# Patient Record
(demographics unavailable — no encounter records)

---

## 2024-11-14 NOTE — HEALTH RISK ASSESSMENT
[No] : In the past 12 months have you used drugs other than those required for medical reasons? No [Never] : Never [] :  [With Significant Other] : lives with significant other [Sexually Active] : sexually active [Little interest or pleasure doing things] : 1) Little interest or pleasure doing things [Feeling down, depressed, or hopeless] : 2) Feeling down, depressed, or hopeless [0] : 2) Feeling down, depressed, or hopeless: Not at all (0) [PHQ-2 Negative - No further assessment needed] : PHQ-2 Negative - No further assessment needed [NO] : No [Employed] : employed [de-identified] : tries to go to the gym -- does cardio and weights.   [de-identified] : feels it is okay [[id number]] : 0 [High Risk Behavior] : no high risk behavior [FreeTextEntry2] : RN

## 2024-11-14 NOTE — COUNSELING
[Potential consequences of obesity discussed] : Potential consequences of obesity discussed [Benefits of weight loss discussed] : Benefits of weight loss discussed [Encouraged to increase physical activity] : Encouraged to increase physical activity [Decrease Portions] : decrease portions [____ min/wk Activity] : [unfilled] min/wk activity [Good understanding] : Patient has a good understanding of disease, goals and obesity follow-up plan [FreeTextEntry4] : 15

## 2024-11-14 NOTE — PHYSICAL EXAM
[No Acute Distress] : no acute distress [Well-Appearing] : well-appearing [Normal Sclera/Conjunctiva] : normal sclera/conjunctiva [EOMI] : extraocular movements intact [Normal Outer Ear/Nose] : the outer ears and nose were normal in appearance [Normal Nasal Mucosa] : the nasal mucosa was normal [No Respiratory Distress] : no respiratory distress  [No Accessory Muscle Use] : no accessory muscle use [Clear to Auscultation] : lungs were clear to auscultation bilaterally [Normal Rate] : normal rate  [Regular Rhythm] : with a regular rhythm [Normal S1, S2] : normal S1 and S2 [No Edema] : there was no peripheral edema [No CVA Tenderness] : no CVA  tenderness [No Spinal Tenderness] : no spinal tenderness [No Joint Swelling] : no joint swelling [Grossly Normal Strength/Tone] : grossly normal strength/tone [Coordination Grossly Intact] : coordination grossly intact [Normal Gait] : normal gait [Normal Affect] : the affect was normal [Normal Insight/Judgement] : insight and judgment were intact [PERRL] : pupils equal round and reactive to light [Normal Oropharynx] : the oropharynx was normal [Normal TMs] : both tympanic membranes were normal [No Lymphadenopathy] : no lymphadenopathy [Supple] : supple [Soft] : abdomen soft [Non Tender] : non-tender [Non-distended] : non-distended [Normal Bowel Sounds] : normal bowel sounds [Normal Posterior Cervical Nodes] : no posterior cervical lymphadenopathy [Normal Anterior Cervical Nodes] : no anterior cervical lymphadenopathy

## 2024-11-14 NOTE — HISTORY OF PRESENT ILLNESS
[FreeTextEntry1] : CPE [de-identified] : Patient presents today for a CPE.  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  Pt also has a PMHx of epilepsy /2 to cavernoma -- has not had a seizure since 2021 and has been off AEDs for about 2 years. Has followed with neurology regularly, but no longer requires follow ups.

## 2024-11-27 NOTE — HISTORY OF PRESENT ILLNESS
[FreeTextEntry1] : Ms. Mason is a 36-year-old woman with class 1 obesity, polycystic ovary syndrome (PCOS), irritable bowel syndrome (IBS), and a past brain cavernoma with associated seizure disorder (seizure-free and off anti-seizure medications for at least two years following surgical intervention) who presents to the clinic to establish care for weight management. She works as a nurse for [employer].   OBESITY Ms. Mason has been on a weight loss journey for many years, as she has struggled with being overweight since childhood. She once followed a strict diet and exercise regimen that helped her achieve a "normal weight". However, after discontinuing this lifestyle approximately 14-15 years ago, she began to regain weight. 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 She believes that this difficulty, along with the introduction of anti-seizure medications, may have contributed to her weight gain.  In 2021, following brain surgery, her weight peaked at 270 lbs. Through determined dietary and lifestyle changes, including exercising two to three hours daily, she managed to lose approximately 50 lbs. Previously, she consumed a diet high in sweets and carbohydrates, which she has since modified. Her weight plateaued at 220 lbs. Subsequently, she began taking Tirzepatide three months ago, initially at 2.5 mg for the first two months and currently at 5 mg for the past month, obtained through a compound pharmacy. She initially opted not to pursue insurance coverage but now seeks it due to FDA regulations prohibiting compounded formulations of this medication. She denies experiencing side effects from the medication and notes that her IBS symptoms have improved. Since starting Tirzepatide, she has lost an additional 20 lbs and aims to reach a weight of 150-160 lbs. She is afraid of weight regain if she cannot continue the medication moving forward.   Regarding her diet, she consumes 6-7 cups of coffee with low-fat milk (1%) daily, skips breakfast, and typically eats chicken with or without vegetables for lunch. Her dinner includes yogurt and fruit, such as pears or apples, and she denies consuming snacks, soda, or juice.  Her exercise regimen includes cardio, weight training, and dancing classes, with gym visits four times per week, and she completes 30 minutes on a stationary bike at home three times per week on workdays.  PCOS Ms. Mason has been informed that she may have polycystic ovary syndrome (PCOS) due to her irregular menstrual cycles and hirsutism. Additionally, previous laboratory tests have indicated hyperandrogenism, as shown by elevated testosterone levels in the 50s ng/dL.  She began menstruating at age 17 and experienced heavy bleeding, which was initially managed with birth control pills prescribed in [place]. She used these for nearly 10 years. She later transitioned to an IUD (Mirena) and is currently on her second one. During its use, she did not experience menstrual cycles. However, since starting Tirzepatide, she has noticed light spotting over the past three months, though not heavy bleeding. There have been no changes in her peripheral vision or breast secretions.  She reports excessive facial hair growth, requiring shaving every other day, and also shaves her arms. She has light-colored stretch marks, which were once slightly purple, that appeared during significant weight gain many years ago. Additionally, she reports experiencing easy bruising and bleeding.

## 2024-11-27 NOTE — ADDENDUM
[FreeTextEntry1] : Time-Based Billing: I have spent 55 minutes on the encounter. This includes time spent with the patient during the visit as well as time spent before and after the visit reviewing the chart, documenting the encounter, making phone calls, reviewing studies, etc.

## 2024-11-27 NOTE — REVIEW OF SYSTEMS
[Recent Weight Loss (___ Lbs)] : recent weight loss: [unfilled] lbs [Irregular Menses] : irregular menses [Hirsutism] : hirsutism [Easy Bleeding] : a ~M tendency for easy bleeding [Easy Bruising] : a tendency for easy bruising [Fatigue] : no fatigue [Fever] : no fever [Visual Field Defect] : no visual field defect [Chest Pain] : no chest pain [Shortness Of Breath] : no shortness of breath [Nausea] : no nausea [Constipation] : no constipation [Vomiting] : no vomiting [Diarrhea] : no diarrhea [Galactorrhea] : no galactorrhea

## 2024-11-27 NOTE — PHYSICAL EXAM
[Alert] : alert [Well Nourished] : well nourished [Obese] : obese [No Acute Distress] : no acute distress [Normal Sclera/Conjunctiva] : normal sclera/conjunctiva [EOMI] : extra ocular movement intact [No Proptosis] : no proptosis [Thyroid Not Enlarged] : the thyroid was not enlarged [No Thyroid Nodules] : no palpable thyroid nodules [No Respiratory Distress] : no respiratory distress [No Accessory Muscle Use] : no accessory muscle use [Clear to Auscultation] : lungs were clear to auscultation bilaterally [Normal S1, S2] : normal S1 and S2 [Normal Rate] : heart rate was normal [Regular Rhythm] : with a regular rhythm [No Edema] : no peripheral edema [Not Tender] : non-tender [Soft] : abdomen soft [Normal Gait] : normal gait [Normal Reflexes] : deep tendon reflexes were 2+ and symmetric [No Tremors] : no tremors [Oriented x3] : oriented to person, place, and time

## 2024-11-27 NOTE — ASSESSMENT
[Weight Loss] : weight loss [FreeTextEntry1] : 1. Class 1 obesity (BMI 31) - She successfully lost 50 lbs on her own but reports that her weight loss plateaued, and therefore she started Tirzepatide from a compound pharmacy. She has been using it for the past 3 months with an additional 20-pound weight loss reported over the past 3 months.  Goal weight is approximately 150-160 pounds.  Current weight is 199 pounds.  Desires to lose an additional 50 pounds. - Continue Zepbound 5 mg weekly.  Discussed the need for a bariatric surgeon consultation and possible enrollment in the [facility] program per insurance requirements. Patient to contact insurance regarding prior authorization requirements.  - Continue dietary and lifestyle changes.  - Referred to nutritionist for further diet counseling.   2. PCOS: - Irregular menses and hirsutism raise suspicion for PCOS. - Discussed options for hirsutism management, including oral contraceptive pills, spironolactone (second-line if OCPs are ineffective), and laser hair removal. Patient plans to continue with IUD for another year, focus on weight loss, and proceed with laser hair removal.  - Check 1-mg dexamethasone suppression test to rule out hypercortisolism as a contributing factor to weight gain. 17-OHP previously within normal limits. Recent thyroid function tests were normal earlier this month.   Follow-up in 3 months.

## 2024-12-27 NOTE — ASSESSMENT
[FreeTextEntry1] : Nausea: The patient complains of nausea. If the symptoms of nausea persists, the patient may require a trial of Zofran 4 mg twice a day.  If the symptoms persist, the patient may require an upper endoscopy to assess for peptic ulcer disease versus esophagitis.  The patient was told of the risks and benefits of the procedure.  The patient was told of the risks of perforation, emergency surgery, bleeding, infections and missed lesions.  The patient agreed and will follow-up to reassess the symptoms. Rectal Bleeding: The patient had episodes of rectal bleeding.  The etiology of the rectal bleeding is unclear.  I recommend a trial of Anusol HC suppositories one per rectum QHS and Anusol HC 2.5% cream apply to affected area twice a day PRN hemorrhoidal bleeding or pain.  I recommend a trial of Calmoseptine cream apply to affected area twice a day for rectal discomfort. I recommend Tucks pads for the hemorrhoids.  I recommend starting Sitz baths twice a day for the hemorrhoids.  I recommend avoid wearing tight underwear and use boxers. I recommend avoid touching the perineal area. I recommend a colonoscopy to assess the site of bleeding. The patient was told of the risks and benefits of the procedure.  The patient was told of the risks of perforation, emergency surgery, bleeding, infections and missed lesions.  The patient agreed and will schedule for the procedure. The patient is to be n.p.o. after midnight and bowel prep was given.  The patient is to return for the procedure. Family History of Colon Cancer: The patient has a family history of colon cancer.  The patient's maternal grandfather had a history of colon cancer.   Colonoscopy: I recommend a colonoscopy to assess the symptoms and for colonic polyps.  The patient was told of the risks and benefits of the procedure.  The patient was told of the risks of perforation, emergency surgery, bleeding, infections and missed lesions.  The patient is to be on a clear liquid diet the entire day prior to the procedure. The patient is to complete the entire prescribed bowel prep the day prior to the procedure as directed. The patient is told not to drive, drink alcohol, use recreational drugs, exercise, or work the day of the procedure.  The patient was told of the need for an escort to accompany the patient home after the procedure. The patient is aware that the procedure may be cancelled if they fail to follow the directions.  The patient agreed and will schedule for the procedure. The patient is to be n.p.o. after midnight and bowel prep was given.  The patient is to return for the procedure. Follow-up: The patient is to follow-up in the office in 4 weeks to reassess the symptoms. The patient was told to call the office if any further problems.

## 2024-12-27 NOTE — HISTORY OF PRESENT ILLNESS
[FreeTextEntry1] : The patient is a 36-year-old female with past medical history significant for epilepsy, s/p brain surgery for Cavernoma in , bradycardia, irritable bowel syndrome diagnosed 1 year ago on PRN Bentyl, on Zepbound for weight loss and polycystic ovarian syndrome who was referred to my office by Dr. Kole Dunlap for rectal bleeding.  The patient also complained of prior diarrhea, change in bowel habits and change in caliber of stool. The patient also admits to having nausea. The patient has a family history of colon cancer.  The patient's maternal grandfather had a history of colon cancer.  I was asked to render an opinion for consultation for the above complaints.   The patient states that she is feeling fine.  The patient denies any abdominal pain.  The patient denies any abdominal gas and bloating.  The patient complains of nausea secondary to Zepbound but denies any vomiting.  The patient complains of gastroesophageal reflux disease but denies any dysphagia. The gastroesophageal reflux disease is worse after meals and in the early morning.  The gastroesophageal reflux disease is improved with proton pump inhibitors, H2 blockers and antacids.  The patient denies any atypical chest pain, shortness of breath or palpitations.  The patient denies any diaphoresis. The patient admits to occasional episodes of diaphoresis.  The chest pain is described as being 5 out of 10 in intensity.  The chest pain can occur at any time.  The chest pain is worse at night and early morning.  The chest pain is worse after meals and improves with passing gas.  The chest pain is unrelated to meals and passing gas.  The chest pain never awakened the patient from sleep.  The patient complains of nausea and vomiting but denies any nausea or vomiting.  The patient previously complained of diarrhea for a few weeks approximately 1 year ago that improved with Bentyl.  The patient currently denies any constipation or diarrhea.  The patient has 1 bowel movement a day. The diarrhea was previously described as watery in nature.   The patient complained of a change in bowel habits.  The patient complained of a change in caliber of stool.   The patient admits to having mucus discharge with the bowel movements.  The patient complains of rectal bleeding x 2 months but denies any melena or hematemesis.  The rectal bleeding is associated with internal hemorrhoids.  The patient complains of rectal pruritus but denies any rectal pain. The patient complains of weight loss but denies any anorexia.  The patient admits to losing 33 pounds over the past 3 months. The patient attributes the weight loss to recent change in diet, Zepbound and exercise. She denies any fevers or chills.  The patient denies any jaundice or pruritus.  The patient denies any back pain.  The blood work performed on 2024 revealed a normal WBC count of 7.61 K/UL, no evidence of anemia with a hemoglobin/hematocrit level of 14.6/45.0, respectively, a normal platelet count of 282,000, and elevated total cholesterol level of 201 mg/dL, a normal triglyceride level of 54 mg/dL, normal liver enzymes with a total bilirubin of 0.6 mg/dL, a normal alkaline phosphatase/AST/ALT of 78/15/11 E/L, respectively, a normal hemoglobin A1c of 4.6% with an estimated average glucose of 85 mg/dL.  The blood work performed on 2024 revealed a normal free T4 level of 1.1 ng/dL, and elevated prolactin level of 31.0 ng/mL, a normal FSH of 4.1 IU/L, a normal TSH of 1.17u IU/mL, a normal LH of 5.9 IU/L, a normal testosterone level of 22.3 ng/dL, a normal cortisol level of 0.6 mcg/dL, a normal 17 hydroxyprogesterone level of 121 ng/dL. The patient denies ever having a prior upper endoscopy and colonoscopy performed by another gastroenterologist.  The patient's last menstrual period was approximately 6 months ago.  The patient admits to having an IUD placed.  The patient's periods are irregular at 28 to 30 days.  The patient's menstrual periods are heavy for 7 days.  The patient is a .  The patient's first menstrual period was at age 17. The patient admits to a family history of GI problems.  The patient's maternal grandfather had a history of colon cancer and maternal aunt had colonic polyps.  (-) smoking, (-) ETOH, (-) IVDA  Physical Exam: General Appearance: Overweight, no acute distress ENT:  nose clear, ears unremarkable Eyes: No enteric sclera, conjunctiva clear. Neck: Supple, without masses  Respiratory: Breath sounds equal and bilateral, no wheezing no rales or rhonchi Cardiovascular: S1-S2 audible, no murmur, no rubs or gallops GI: (+) BS, soft, nontender, no rebound, no guarding, no masses Liver: liver edge palpated Rectal: nontender, no masses, good sphincter tone, brown stool. Stool for Guaiac sent to lab for occult blood Lymphatics: No palpable lymph nodes, no masses Musculo-skeletal: Good motor strength, good range of motion, normal appearing extremities Skin: Normal appearing skin, no jaundice, no rashes or nodules Neurological: without focal motor or sensory deficits Patient is moving all extremities spontaneously and to command with normal muscle strength alert and oriented X3 Psychiatric: Good affect, not depressed, not anxious

## 2025-01-02 NOTE — HISTORY OF PRESENT ILLNESS
[FreeTextEntry1] :  ECHO BERMAN ,35 YO, Presents for Annual  No complaints PMHX: Epilepsey, Irritable bowel syndrome, gastroesophageal reflux disease (gerd) PSHX: Brain surgery GYNHX :(Denies Not a smoker LMP: Not sure. Patient has an IUD Medication Zepbound 5 MG, pantoprazole 40 mg Not sexually active Last pap was in 2021 Never had a Mammo No family History of breast cancer. (Colon cancer in the family)

## 2025-01-08 NOTE — ASSESSMENT
[FreeTextEntry1] : --  #hand dermatitis -gentle care, beta dip prn flares. Proper use and SE meds disc  #recurrent folliculitis R inner thigh - 2 solitary lesions - healing. none active today -discussed with pt ddx recurrent follic. pt worried about HS. could represent early stage I - possible. but definitely not dfinitive at this pt. would need to follow over time -rec start for tx and prevention daily with bathing -hibi wash -clinda topical

## 2025-01-08 NOTE — PHYSICAL EXAM
[FreeTextEntry3] :  Gen:                        Well appearing, well nourished, NAD. Skin:                       Eccrine:                 Within normal limits           Face:                      UE:                         LE:                          Groin:                             Neuro:                     No focal deficits. Age appropriate. Psych:                     Appropriate affect.

## 2025-01-08 NOTE — HISTORY OF PRESENT ILLNESS
[FreeTextEntry1] : 2 nodules R inner thigh [de-identified] : 2 nodules R inner thigh healing for past 3 months. pt worried about HS. no tenderness, active lesions or draining today also hand dermatitis a/w req hand washing

## 2025-01-29 NOTE — ASSESSMENT
[FreeTextEntry1] : Abdominal Pain: The patient complains of abdominal pain. The patient is to avoid nonsteroidal anti-inflammatory drugs and aspirin.  I recommend a low FOD-MAP diet.  I recommend a trial of Dicyclomine 10 mg tablet PO 3 times a day PRN for the abdominal pain. Dyspepsia: The patient complains of dyspeptic symptoms.  The patient was advised to continue to abide by an anti-gas (low FOD-MAP) diet.  The patient was previously given a pamphlet for anti-gas (low FOD-MAP).  The patient and I reviewed the anti-gas (low FOD-MAP) diet at length again. The patient is to continue on a trial of Simethicone one tablet 4 times a day p.r.n. abdominal pain and gas. GERD: The patient was advised to avoid late-night meals and dietary indiscretions.  The patient was advised to avoid fried and fatty foods.  The patient was advised to abide by an anti-GERD diet. The patient was given a pamphlet for anti-GERD.  The patient and I reviewed the anti-GERD diet at length. I recommend a trial of Pantoprazole 40 mg once a day x 3 months for the symptoms. Diarrhea: The patient complains of diarrhea.  I recommend a low residue diet. The patient is to avoid fiber supplementation. The patient is to consider starting a trial of a probiotic such as Align once a day.  If the symptoms persist, the patient may require sending stool studies for C+S, O+P x3, and C. difficile to assess for an infectious etiology of the diarrhea.   The patient agreed and will follow-up to reassess the symptoms.   Colonic Polyp: The patient was found to have colonic polyps on prior colonoscopy.  I recommend a repeat colonoscopy in 5 years to reassess for colonic polyps pending patient's health unless symptomatic.  The patient agreed and will follow up for the procedure.  Internal Hemorrhoids: The patient is to consider starting a trial of Anusol H. C. suppositories one per rectum nightly and Anusol HC2 .5% cream apply to affected area twice a day p.r.n. hemorrhoidal bleeding or pain. I also recommend a trial of Calmoseptine cream apply to affected area twice a day for hemorrhoidal discomfort.  I recommend Tucks pads for the hemorrhoids.  I recommend Sitz baths twice a day for the hemorrhoids.  I recommend avoid wearing tight underwear and use boxers.  I recommend avoid touching the perineal area.  The patient agreed to followup.  Family History of Colon Cancer: The patient has a family history of colon cancer.  I recommend a colonoscopy in 5 years to assess for colonic polyps unless symptomatic.  The patient agreed and will follow up for the procedure.  Family History of Colon Cancer: The patient has a family history of colon cancer. The patient's maternal grandfather had a history of colon cancer. Follow-up: The patient is to follow-up in the office in 1 year to reassess the symptoms. The patient was told to call the office if any further problems.

## 2025-01-29 NOTE — HISTORY OF PRESENT ILLNESS
[FreeTextEntry1] : The colonoscopy to the terminal ileum performed at the [facility] endoscopy suite on December 30, 2024 revealed a cecal polyp and small internal hemorrhoids. The colonic polyp was completely removed with a jumbo biopsy forcep. There were no other polyps, masses, diverticulosis, AVMs or colitis noted.  The colonic pathology performed on December 30, 2024 revealed minute fragments of colonic mucosa with early stages of tubular adenoma (cecal polyp at 80 cm).

## 2025-02-25 NOTE — REVIEW OF SYSTEMS
[Recent Weight Loss (___ Lbs)] : recent weight loss: [unfilled] lbs [Fatigue] : no fatigue [Fever] : no fever [Chills] : no chills [Visual Field Defect] : no visual field defect [Chest Pain] : no chest pain [Shortness Of Breath] : no shortness of breath [Nausea] : no nausea [Vomiting] : no vomiting [Irregular Menses] : irregular menses

## 2025-02-25 NOTE — ASSESSMENT
[FreeTextEntry1] : 1. Obesity and overweight -  She has lost an additional 13 lbs since her last visit, currently at 187 lbs (BMI 29). Self-increased Zepbound to 7.5 mg for the past 2 weeks as she felt her weight loss had plateaued.  - CONTINUE Zepbound 7.5 mg weekly for now.  - Continue dietary and lifestyle changes.  - Previously referred to nutritionist.   2. PCOS / Elevated prolactin - Irregular menses and hirsutism raise suspicion for PCOS. - Currently with IUD to manage irregular menses. Plans to undergo with laser hair removal.  - Prolactin noted to be borderline elevated, likely not clinically significant. Check macroprolactin.   3. Hyperlipidemia - Check lipid profile.   Follow-up in 3 months. Normal for race

## 2025-02-25 NOTE — HISTORY OF PRESENT ILLNESS
[FreeTextEntry1] : Ms. Mason is a 36-year-old woman with class 1 obesity, polycystic ovary syndrome (PCOS), irritable bowel syndrome (IBS), and a past brain cavernoma with associated seizure disorder (seizure-free and off anti-seizure medications for at least two years following surgical intervention) who presents to the clinic to establish care for weight management. She works as a nurse for [employer].   OBESITY Ms. Mason has been on a weight loss journey for many years, as she has struggled with being overweight since childhood. She once followed a strict diet and exercise regimen that helped her achieve a "normal weight". However, after discontinuing this lifestyle approximately 14-15 years ago, she began to regain weight. 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 She believes that this difficulty, along with the introduction of anti-seizure medications, may have contributed to her weight gain.  In 2021, following brain surgery, her weight peaked at 270 lbs. Through determined dietary and lifestyle changes, including exercising two to three hours daily, she managed to lose approximately 50 lbs. Previously, she consumed a diet high in sweets and carbohydrates, which she has since modified. Her weight plateaued at 220 lbs. Subsequently, she began taking Tirzepatide three months ago, initially at 2.5 mg for the first two months and currently at 5 mg for the past month, obtained through a compound pharmacy. She initially opted not to pursue insurance coverage but now seeks it due to FDA regulations prohibiting compounded formulations of this medication. She denies experiencing side effects from the medication and notes that her IBS symptoms have improved. Since starting Tirzepatide, she has lost an additional 20 lbs and aims to reach a weight of 150-160 lbs. She is afraid of weight regain if she cannot continue the medication moving forward.   Regarding her diet, she consumes 6-7 cups of coffee with low-fat milk (1%) daily, skips breakfast, and typically eats chicken with or without vegetables for lunch. Her dinner includes yogurt and fruit, such as pears or apples, and she denies consuming snacks, soda, or juice.  Her exercise regimen includes cardio, weight training, and dancing classes, with gym visits four times per week, and she completes 30 minutes on a stationary bike at home three times per week on workdays.  PCOS Ms. Mason has been informed that she may have polycystic ovary syndrome (PCOS) due to her irregular menstrual cycles and hirsutism. Additionally, previous laboratory tests have indicated hyperandrogenism, as shown by elevated testosterone levels in the 50s ng/dL.  She began menstruating at age 17 and experienced heavy bleeding, which was initially managed with birth control pills prescribed in [place]. She used these for nearly 10 years. She later transitioned to an IUD (Mirena) and is currently on her second one. During its use, she did not experience menstrual cycles. However, since starting Tirzepatide, she has noticed light spotting over the past three months, though not heavy bleeding. There have been no changes in her peripheral vision or breast secretions.  She reports excessive facial hair growth, requiring shaving every other day, and also shaves her arms. She has light-colored stretch marks, which were once slightly purple, that appeared during significant weight gain many years ago. Additionally, she reports experiencing easy bruising and bleeding.  2/25/25 - Since her last visit, her weight has continued to decrease from 199 lbs (BMI 31.1) to 187 lbs (BMI 29.2). She had been taking Mounjaro 5 mg weekly without experiencing any side effects. Initially, the 5 mg dosage worked effectively for the first two weeks, but she saw no weight loss over the following six weeks. She had some leftover compound Tirzepatide; therefore, she self-increased her dose to 7.5 mg weekly. Previously, she had been stuck in the 190s lbs, but was able to continue losing weight on this new dose. I advised her against this practice. She is very happy with her progress and reports that her gastroesophageal reflux disease is resolved, no longer requiring any proton pump inhibitors.

## 2025-02-25 NOTE — PHYSICAL EXAM
[Alert] : alert [Well Nourished] : well nourished [Obese] : obese [No Acute Distress] : no acute distress [Normal Sclera/Conjunctiva] : normal sclera/conjunctiva [EOMI] : extra ocular movement intact [No Proptosis] : no proptosis [Thyroid Not Enlarged] : the thyroid was not enlarged [No Thyroid Nodules] : no palpable thyroid nodules [No Respiratory Distress] : no respiratory distress [No Accessory Muscle Use] : no accessory muscle use [Clear to Auscultation] : lungs were clear to auscultation bilaterally [Normal S1, S2] : normal S1 and S2 [Normal Rate] : heart rate was normal [Regular Rhythm] : with a regular rhythm [No Edema] : no peripheral edema [Not Tender] : non-tender [Soft] : abdomen soft [Normal Gait] : normal gait [Oriented x3] : oriented to person, place, and time

## 2025-03-24 NOTE — HISTORY OF PRESENT ILLNESS
[de-identified] : 03/24/2025:  ECHO BERMAN is a 36 year y/o F here for evaluation of their bilateral hip. pt states the pain started 2 months ago. pain is mainly during the day when working taking Tylenol as needed. complains of constant throbbing. denies n/t and notes a fall 4 years ago from falling on the stairs notes her back has a lot of pain since then unsure if it is related. 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

## 2025-03-24 NOTE — ASSESSMENT
[FreeTextEntry1] : 36 year F with bilateral hip tendonitis  - physical therapy and NSAIDs (mobic) was prescribed  - Return in 6 weeks for follow up PRN

## 2025-03-24 NOTE — IMAGING
[de-identified] : LOWER EXTREMITY/ HIP EXAM Standing pelvic alignment: Symmetric with no Trendelenburg Atrophy: none Ecchymosis/swelling: none  Range of Motion Hip: Flexion/extension/ER/IR     / EX 20/ ER 45/ IR 20 / AB 60 /AD 20 Impingement with flexion adduction and internal rotation: negative Contracture: none Snapping hip: negative Greater trochanter: no tenderness TTP abudctor tendons  Neurovascular Distal extremities: warm to touch Sensation to light touch: intact Muscle strength: 5/5  Back Alignment: normal Spinous process: no tenderness Paraspinal tenderness: No tenderness Range of motion: full and pain free Scoliosis: none Straight leg sign: negative  [id number] Xrays of the Left hip 3v were taken demonstrating no signs of fractures, dislocations,or significant arthritis.

## 2025-03-26 NOTE — ASSESSMENT
[FreeTextEntry1] : -- folliculitis vs early HS R groin - topicals not helping hair thinning - noted after starting zepbound hand dermatitis using ointment  #folliculitis vs early HS R groin - flaring on tx -continue hibi/clinda topicals -start doxy 50mg BID. SED -ILK #1 today  ILK - 2 lesions on the *R groin treated with a total of 0.1 cc @ 10 mg/cc. R/b/a ILK discussed. Verbal consent obtained. Injections well tolerated without complication.  #Hair thinning - new problem with exacerbation -noted after starting zepbound. possibility of TE with hormonal changes disc however hair pull neg today, not c/w active TE. benign course of TE disc. dermoscopy with suggestion of very early/mild AGA. no other findings on scalp. no sympts -trial minox %5 foam  #hand dermatitis -gentle care, beta dip prn flares.

## 2025-03-26 NOTE — PHYSICAL EXAM
[FreeTextEntry3] :  Gen:                        Well appearing, well nourished, NAD. Skin:                       Eccrine:                 Within normal limits            Hair:   Face:                      UE:                         LE:                          Groin:                          Neuro:                     No focal deficits. Age appropriate. Psych:                     Appropriate affect.

## 2025-03-26 NOTE — HISTORY OF PRESENT ILLNESS
[FreeTextEntry1] : folliculitis, hair thinning, hand dermatitis [de-identified] : folliculitis vs early HS R groin - topicals not helping hair thinning - noted after starting zepbound hand dermatitis using ointment

## 2025-04-03 NOTE — HISTORY OF PRESENT ILLNESS
[de-identified] : 36-year-old female with obesity, chronic abdominal pain, acid reflux, epilepsy, hyperlipidemia, PCOS, past brain cavernoma with associated seizure disorder (seizure-free and off anti-seizure medications for at least two years following surgical intervention) presenting today for physical examination. [No Pertinent Cardiac History] : no history of aortic stenosis, atrial fibrillation, coronary artery disease, recent myocardial infarction, or implantable device/pacemaker [No Pertinent Pulmonary History] : no history of asthma, COPD, sleep apnea, or smoking [No Adverse Anesthesia Reaction] : no adverse anesthesia reaction in self or family member [Chronic Anticoagulation] : no chronic anticoagulation [Chronic Kidney Disease] : no chronic kidney disease [Diabetes] : no diabetes [(Patient denies any chest pain, claudication, dyspnea on exertion, orthopnea, palpitations or syncope)] : Patient denies any chest pain, claudication, dyspnea on exertion, orthopnea, palpitations or syncope [Moderate (4-6 METs)] : Moderate (4-6 METs) [FreeTextEntry1] : Right Dorsal Cyst Incision [FreeTextEntry2] : 04/10/2025 [FreeTextEntry3] : Dr. Ciaran Bustos [FreeTextEntry4] : 36-year-old female with obesity, IBS, GERD, bradycardia, PCOS is presenting today for a preoperative clearance.  She is scheduled for a right dorsal ganglion cyst excision on April 10.  She reports having a brain surgery (cavernous malformation several years ago) without any adverse reactions.  This was her only surgery.  She exercises about 4 times a week without any adverse effects. [FreeTextEntry7] : Transthoracic Echocardiogram March 7th 2024- WNL

## 2025-04-03 NOTE — PHYSICAL EXAM
[Normal] : normal rate, regular rhythm, normal S1 and S2 and no murmur heard [No Edema] : there was no peripheral edema [de-identified] : Right wrist ganglion noted

## 2025-04-03 NOTE — HEALTH RISK ASSESSMENT
[FreeTextEntry1] : no [de-identified] : no [de-identified] : no [Audit-CScore] : 0 [[id number]] : 0 [High Risk Behavior] : no high risk behavior [Reports changes in hearing] : Reports no changes in hearing [Reports changes in vision] : Reports no changes in vision [Reports changes in dental health] : Reports no changes in dental health [PapSmearDate] : 11/2024 [Never] : Never

## 2025-04-03 NOTE — HISTORY OF PRESENT ILLNESS
[de-identified] : 36-year-old female with obesity, chronic abdominal pain, acid reflux, epilepsy, hyperlipidemia, PCOS, past brain cavernoma with associated seizure disorder (seizure-free and off anti-seizure medications for at least two years following surgical intervention) presenting today for physical examination. [No Pertinent Cardiac History] : no history of aortic stenosis, atrial fibrillation, coronary artery disease, recent myocardial infarction, or implantable device/pacemaker [No Pertinent Pulmonary History] : no history of asthma, COPD, sleep apnea, or smoking [No Adverse Anesthesia Reaction] : no adverse anesthesia reaction in self or family member [Chronic Anticoagulation] : no chronic anticoagulation [Chronic Kidney Disease] : no chronic kidney disease [Diabetes] : no diabetes [(Patient denies any chest pain, claudication, dyspnea on exertion, orthopnea, palpitations or syncope)] : Patient denies any chest pain, claudication, dyspnea on exertion, orthopnea, palpitations or syncope [Moderate (4-6 METs)] : Moderate (4-6 METs) [FreeTextEntry1] : Right Dorsal Cyst Incision [FreeTextEntry2] : 04/10/2025 [FreeTextEntry3] : Dr. Ciaran Bustos [FreeTextEntry4] : 36-year-old female with obesity, IBS, GERD, bradycardia, PCOS is presenting today for a preoperative clearance.  She is scheduled for a right dorsal ganglion cyst excision on April 10.  She reports having a brain surgery (cavernous malformation several years ago) without any adverse reactions.  This was her only surgery.  She exercises about 4 times a week without any adverse effects. [FreeTextEntry7] : Transthoracic Echocardiogram March 7th 2024- WNL

## 2025-04-03 NOTE — HEALTH RISK ASSESSMENT
[FreeTextEntry1] : no [de-identified] : no [de-identified] : no [Audit-CScore] : 0 [[id number]] : 0 [High Risk Behavior] : no high risk behavior [Reports changes in hearing] : Reports no changes in hearing [Reports changes in vision] : Reports no changes in vision [Reports changes in dental health] : Reports no changes in dental health [PapSmearDate] : 11/2024 [Never] : Never

## 2025-04-03 NOTE — PLAN
[FreeTextEntry1] :  Total time spent caring for the patient today was 45 minutes. This includes time spent before the visit reviewing the chart, time spent during the visit, and time spent after the visit on documentation, etc.

## 2025-04-03 NOTE — ASSESSMENT
[High Risk Surgery - Intraperitoneal, Intrathoracic or Supringuinal Vascular Procedures] : High Risk Surgery - Intraperitoneal, Intrathoracic or Supringuinal Vascular Procedures - No (0) [Ischemic Heart Disease] : Ischemic Heart Disease - No (0) [Congestive Heart Failure] : Congestive Heart Failure - No (0) [Prior Cerebrovascular Accident or TIA] : Prior Cerebrovascular Accident or TIA - No (0) [Creatinine >= 2mg/dL (1 Point)] : Creatinine >= 2mg/dL - No (0) [Insulin-dependent Diabetic (1 Point)] : Insulin-dependent Diabetic - No (0) [Patient Optimized for Surgery] : Patient optimized for surgery [No Further Testing Recommended] : no further testing recommended [Modify medications prior to procedure] : Modify medications prior to procedure [As per surgery] : as per surgery [FreeTextEntry7] : Hold GLP-1 agonist 1 week prior to surgery as well as NSAIDs

## 2025-04-03 NOTE — PHYSICAL EXAM
[Normal] : normal rate, regular rhythm, normal S1 and S2 and no murmur heard [No Edema] : there was no peripheral edema [de-identified] : Right wrist ganglion noted

## 2025-05-07 NOTE — HISTORY OF PRESENT ILLNESS
[de-identified] : DOS: 4/10/25.   [de-identified] : 3 weeks 6 days s/p Right dorsal ganglion cyst excision from dorsal distal radioulnar joint. [de-identified] : Excellent wrist flexion and extension.  Has full supination and pronation to to neutral. [de-identified] : 3 weeks 6 days s/p Right dorsal ganglion cyst excision from dorsal distal radioulnar joint. [de-identified] : Patient will begin therapy.  She will be given a Agness type splint.  She will start working on rotation.  Splint to discontinue and wean

## 2025-05-19 NOTE — HISTORY OF PRESENT ILLNESS
[FreeTextEntry1] : Ms. Mason is a 36-year-old woman with class 1 obesity, polycystic ovary syndrome (PCOS), irritable bowel syndrome (IBS), and a past brain cavernoma with associated seizure disorder (seizure-free and off anti-seizure medications for at least two years following surgical intervention) who presents to the clinic to establish care for weight management. She works as a nurse for [employer].   OBESITY Ms. Mason has been on a weight loss journey for many years, as she has struggled with being overweight since childhood. She once followed a strict diet and exercise regimen that helped her achieve a "normal weight". However, after discontinuing this lifestyle approximately 14-15 years ago, she began to regain weight. 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 She believes that this difficulty, along with the introduction of anti-seizure medications, may have contributed to her weight gain.  In 2021, following brain surgery, her weight peaked at 270 lbs. Through determined dietary and lifestyle changes, including exercising two to three hours daily, she managed to lose approximately 50 lbs. Previously, she consumed a diet high in sweets and carbohydrates, which she has since modified. Her weight plateaued at 220 lbs. Subsequently, she began taking Tirzepatide three months ago, initially at 2.5 mg for the first two months and currently at 5 mg for the past month, obtained through a compound pharmacy. She initially opted not to pursue insurance coverage but now seeks it due to FDA regulations prohibiting compounded formulations of this medication. She denies experiencing side effects from the medication and notes that her IBS symptoms have improved. Since starting Tirzepatide, she has lost an additional 20 lbs and aims to reach a weight of 150-160 lbs. She is afraid of weight regain if she cannot continue the medication moving forward.   Regarding her diet, she consumes 6-7 cups of coffee with low-fat milk (1%) daily, skips breakfast, and typically eats chicken with or without vegetables for lunch. Her dinner includes yogurt and fruit, such as pears or apples, and she denies consuming snacks, soda, or juice.  Her exercise regimen includes cardio, weight training, and dancing classes, with gym visits four times per week, and she completes 30 minutes on a stationary bike at home three times per week on workdays.  PCOS Ms. Mason has been informed that she may have polycystic ovary syndrome (PCOS) due to her irregular menstrual cycles and hirsutism. Additionally, previous laboratory tests have indicated hyperandrogenism, as shown by elevated testosterone levels in the 50s ng/dL.  She began menstruating at age 17 and experienced heavy bleeding, which was initially managed with birth control pills prescribed in [place]. She used these for nearly 10 years. She later transitioned to an IUD (Mirena) and is currently on her second one. During its use, she did not experience menstrual cycles. However, since starting Tirzepatide, she has noticed light spotting over the past three months, though not heavy bleeding. There have been no changes in her peripheral vision or breast secretions.  She reports excessive facial hair growth, requiring shaving every other day, and also shaves her arms. She has light-colored stretch marks, which were once slightly purple, that appeared during significant weight gain many years ago. Additionally, she reports experiencing easy bruising and bleeding.  2/25/25 - Since her last visit, her weight has continued to decrease from 199 lbs (BMI 31.1) to 187 lbs (BMI 29.2). She had been taking Mounjaro 5 mg weekly without experiencing any side effects. Initially, the 5 mg dosage worked effectively for the first two weeks, but she saw no weight loss over the following six weeks. She had some leftover compound Tirzepatide; therefore, she self-increased her dose to 7.5 mg weekly. Previously, she had been stuck in the 190s lbs, but was able to continue losing weight on this new dose. I advised her against this practice. She is very happy with her progress and reports that her gastroesophageal reflux disease is resolved, no longer requiring any proton pump inhibitors.  5/19/25 - Ms. Mason presented for follow-up of weight management and polycystic ovary syndrome (PCOS). She reports significant weight loss progress, having lost a total of 100 pounds since starting her weight loss journey, with 57 pounds lost while on GLP-1 agonist therapy. Her current weight is 170 pounds with a BMI of 26.6, down from 180 pounds with a BMI of 28.1 at her last visit in April. She was previously on Zepbound 7.5 mg weekly, which was increased to 10 mg on 04/17/2025. However, she reports only taking the 10 mg dose for one dose and experiencing significant dizziness, feeling it was too strong. She subsequently returned to the 7.5 mg dose and reports doing well on this regimen. She also mentions discontinuing the medication for one week in early April due to wrist surgery, as advised by her surgeon.  Regarding her PCOS symptoms, she continues to experience excessive hair growth but states this is not a significant concern for her. She has an intrauterine device (IUD) for contraception which is nearing expiration but still functioning effectively. She denies menstrual periods due to the IUD. She reports significant improvement in several comorbidities: her gastroesophageal reflux disease (GERD) has resolved completely, and she no longer requires proton pump inhibitor therapy.   She expresses interest in eventually reducing her Zepbound dose, stating she doesn't want to be on the medication for the rest of her life and would like to try functioning without it. She requests a medication refill as her current supply will last only for the current month.

## 2025-05-19 NOTE — REVIEW OF SYSTEMS
[Recent Weight Loss (___ Lbs)] : recent weight loss: [unfilled] lbs [Fatigue] : no fatigue [Recent Weight Gain (___ Lbs)] : no recent weight gain [Fever] : no fever [Chills] : no chills [Chest Pain] : no chest pain [Shortness Of Breath] : no shortness of breath [Nausea] : no nausea [Constipation] : no constipation [Vomiting] : no vomiting [Diarrhea] : no diarrhea

## 2025-05-19 NOTE — ASSESSMENT
[FreeTextEntry1] : 1. Obesity and overweight -  Current weight 170 pounds, BMI 26.6, down from 180 pounds (BMI 28.1) at last visit. - Patient has lost a total of 100 pounds since beginning weight loss efforts, with 57 pounds lost while on GLP-1 agonist therapy. - Will continue Zepbound 7.5 mg weekly for the next three months. Plan to attempt dose reduction to 5 mg after three months, per patient's request to eventually discontinue medication. - Patient is approaching normal BMI range (normal <25), approximately 10-15 pounds away from target. - Continue dietary and lifestyle changes.   2. PCOS / Elevated prolactin  - Currently with IUD to manage irregular menses. Discussed potential treatment with OCP or spironolactone for hirsutism, but patient declined at this time. - Prolactin noted to be borderline elevated, likely not clinically significant.   3. Hyperlipidemia - Check lipid profile.   RTC in 3 months

## 2025-05-19 NOTE — PHYSICAL EXAM
[Alert] : alert [Well Nourished] : well nourished [No Acute Distress] : no acute distress [Normal Sclera/Conjunctiva] : normal sclera/conjunctiva [EOMI] : extra ocular movement intact [No Proptosis] : no proptosis [Thyroid Not Enlarged] : the thyroid was not enlarged [No Thyroid Nodules] : no palpable thyroid nodules [No Respiratory Distress] : no respiratory distress [No Accessory Muscle Use] : no accessory muscle use [Clear to Auscultation] : lungs were clear to auscultation bilaterally [Normal S1, S2] : normal S1 and S2 [Normal Rate] : heart rate was normal [Regular Rhythm] : with a regular rhythm [No Edema] : no peripheral edema [Not Tender] : non-tender [Soft] : abdomen soft [Normal Gait] : normal gait [Oriented x3] : oriented to person, place, and time

## 2025-06-18 NOTE — HISTORY OF PRESENT ILLNESS
[de-identified] : DOS: 4/10/25. [de-identified] : 9 weeks 6 days s/p Right dorsal ganglion cyst excision from dorsal distal radioulnar joint. She reports that she stopped therapy two weeks ago She did therapy for three weeks and her hand is doing well. [de-identified] : Slightly hyperpigmented scar.  Full wrist range of motion including pronation supination wrist extension and flexion.  No recurrence.  Dorsal sensory branch of ulnar nerve intact DRUJ stable bilaterally [de-identified] : 9 weeks 6 days s/p Right dorsal ganglion cyst excision from dorsal distal radioulnar joint. [de-identified] : Patient doing very well has recovered range of motion very quickly.  Recommend avoiding sun exposure on that incision and return on an as-needed basis

## 2025-06-18 NOTE — HISTORY OF PRESENT ILLNESS
[de-identified] : DOS: 4/10/25. [de-identified] : 9 weeks 6 days s/p Right dorsal ganglion cyst excision from dorsal distal radioulnar joint. She reports that she stopped therapy two weeks ago She did therapy for three weeks and her hand is doing well. [de-identified] : Slightly hyperpigmented scar.  Full wrist range of motion including pronation supination wrist extension and flexion.  No recurrence.  Dorsal sensory branch of ulnar nerve intact DRUJ stable bilaterally [de-identified] : 9 weeks 6 days s/p Right dorsal ganglion cyst excision from dorsal distal radioulnar joint. [de-identified] : Patient doing very well has recovered range of motion very quickly.  Recommend avoiding sun exposure on that incision and return on an as-needed basis

## 2025-06-26 NOTE — ASSESSMENT
[FreeTextEntry1] : -- folliculitis - chronic with flare doing better on doxy 50 BID. R inner thigh with indurated small nodule. otherwise small follicular papules.  no lesions painful or draining today  #Folliculitis - chronic with flare -suspect recurrent folliculitis > early HS at this time -continue hibi/clinda topicals -doxy 50mg BID has helped. options for continuing vs trial d/c disc. pt would like to continue -ILK #2 today for indurated nodule  ILK - 1 lesions on the *R groin treated with a total of 0.1 cc @ 10 mg/cc. Verbal consent obtained. Injections well tolerated without complication.   #Hand dermatitis -gentle care, beta dip prn flares.  #Hair thinning -noted after starting zepbound possibility of TE with hormonal changes however hair pull neg. benign course of TE disc. dermoscopy with  possible early/mild AGA -trial minox %5 foam

## 2025-06-26 NOTE — HISTORY OF PRESENT ILLNESS
[FreeTextEntry1] : folliculitis [de-identified] : folliculitis - chronic with flare doing better on doxy 50 BID. R inner thigh with indurated small nodule. otherwise small follicular papules.  no lesions painful or draining today

## 2025-06-26 NOTE — PHYSICAL EXAM
[FreeTextEntry3] :  Gen:                        Well appearing, well nourished, NAD. Skin:                       Eccrine:                 Within normal limits            Face:                                        LE:                          Groin:                          Neuro:                     No focal deficits. Age appropriate. Psych:                     Appropriate affect.

## 2025-07-15 NOTE — HISTORY OF PRESENT ILLNESS
[de-identified] : 36-year-old female with obesity, IBS, GERD, bradycardia, PCOS is presenting today for a follow up visit. She continues to lose weight and is currently on Zepbound 7.5 mg weekly. She reports having IBS symptoms due to stress at work and personal stressors. She also feels tired.